# Patient Record
Sex: MALE | Race: WHITE
[De-identification: names, ages, dates, MRNs, and addresses within clinical notes are randomized per-mention and may not be internally consistent; named-entity substitution may affect disease eponyms.]

---

## 2018-08-19 ENCOUNTER — HOSPITAL ENCOUNTER (EMERGENCY)
Dept: HOSPITAL 42 - ED | Age: 33
Discharge: HOME | End: 2018-08-19
Payer: SELF-PAY

## 2018-08-19 ENCOUNTER — HOSPITAL ENCOUNTER (EMERGENCY)
Dept: HOSPITAL 31 - C.ER | Age: 33
Discharge: LEFT BEFORE DISCHARGE | End: 2018-08-19
Payer: SELF-PAY

## 2018-08-19 VITALS
OXYGEN SATURATION: 99 % | SYSTOLIC BLOOD PRESSURE: 124 MMHG | HEART RATE: 86 BPM | RESPIRATION RATE: 19 BRPM | DIASTOLIC BLOOD PRESSURE: 53 MMHG

## 2018-08-19 VITALS — TEMPERATURE: 100 F

## 2018-08-19 DIAGNOSIS — J02.9: Primary | ICD-10-CM

## 2018-08-19 DIAGNOSIS — Z02.89: Primary | ICD-10-CM

## 2018-08-19 DIAGNOSIS — R50.9: ICD-10-CM

## 2018-08-19 PROCEDURE — 99283 EMERGENCY DEPT VISIT LOW MDM: CPT

## 2018-08-19 PROCEDURE — 87070 CULTURE OTHR SPECIMN AEROBIC: CPT

## 2018-08-19 PROCEDURE — 87430 STREP A AG IA: CPT

## 2018-08-19 NOTE — ED PDOC
Arrival/HPI





- General


Chief Complaint: ENT Problem


Time Seen by Provider: 08/19/18 12:04


Historian: Patient





- History of Present Illness


Narrative History of Present Illness (Text): 





08/19/18 12:30





34 y/o male, with no significant PMH, who presents to the emergency department 

complaining of fever associated with sore throat since one day. Patient notes 

taking Tylenol with no significant relief. Patient denies cough, vomit, nausea, 

SOB, chest pain, abdominal pain sick contacts or other complaints. 


Time/Duration: 24 hours


Symptom Onset: Sudden


Symptom Course: Unchanged


Context: Home





Past Medical History





- Provider Review


Nursing Documentation Reviewed: Yes





- Infectious Disease


Hx of Infectious Diseases: None





- Cardiac


Hx Cardiac Disorders: No





- Pulmonary


Hx Respiratory Disorders: No





- Neurological


Hx Neurological Disorder: No





- Endocrine/Metabolic


Hx Endocrine Disorders: No





- Musculoskeletal/Rheumatological


Hx Musculoskeletal Disorders: No





- Psychiatric


Hx Substance Use: No





- Anesthesia


Hx Anesthesia: No





Family/Social History





- Physician Review


Nursing Documentation Reviewed: Yes


Family/Social History: Unknown Family HX


Smoking Status: Unknown If Ever Smoked


Hx Alcohol Use: No


Hx Substance Use: No





Allergies/Home Meds


Allergies/Adverse Reactions: 


Allergies





No Known Allergies Allergy (Verified 08/19/18 12:15)


 











Review of Systems





- Review of Systems


Constitutional: Fevers


ENT: Sore Throat.  absent: Sinus Congestion


Respiratory: absent: SOB


Cardiovascular: absent: Chest Pain


Gastrointestinal: absent: Abdominal Pain


Genitourinary Male: absent: Dysuria


Musculoskeletal: absent: Back Pain


Skin: absent: Rash


Neurological: absent: Headache


Endocrine: absent: Diaphoresis





Physical Exam


Vital Signs Reviewed: Yes


Vital Signs











  Temp Pulse Resp BP Pulse Ox


 


 08/19/18 14:13  100 F H  86  19  124/53 L  99


 


 08/19/18 14:12  100 F H    


 


 08/19/18 13:18  101.6 F H  96 H  18   98


 


 08/19/18 12:11  103 F H  115 H  20  113/66  100











Temperature: Febrile


Blood Pressure: Normal


Pulse: Tachycardic


Respiratory Rate: Normal


Appearance: Positive for: Well-Appearing, Non-Toxic, Comfortable


Pain Distress: None


Mental Status: Positive for: Alert and Oriented X 3





- Systems Exam


Head: Present: Atraumatic, Normocephalic


Pupils: Present: PERRL


Extroacular Muscles: Present: EOMI


Conjunctiva: Present: Normal


Pharnyx: Present: EXUDATE, Other (no hot potato voice).  No: TONSILS ENLARGED, 

Peritonsilar Swelling


Respiratory/Chest: Present: Clear to Auscultation, Good Air Exchange.  No: 

Respiratory Distress, Accessory Muscle Use, Wheezes, Rales, Retracting, Rhonchi


Cardiovascular: Present: Regular Rate and Rhythm, Normal S1, S2.  No: Murmurs


Neurological: Present: GCS=15, CN II-XII Intact, Speech Normal


Skin: Present: Warm, Dry, Normal Color.  No: Rashes


Lymphatic: Present: Cervical Adenopathy


Psychiatric: Present: Alert, Oriented x 3, Normal Insight, Normal Concentration





Medical Decision Making


ED Course and Treatment: 





08/19/18 


Impressions: 34 y/o male with exudate pharynx and cervical adenopathy 

complaining of sore throat and fever since 1 day.





Plans:


-- Decadron and Tylenol


-- Labs





Progress Notes: 


08/24/18 11:45


high clinical suspcion for strep. antibiotics given. pt symptoms improved. 

tachycardia fever resolved. asks for dc





- Lab Interpretations


Microbiology Results: 


Microbiology Results





08/19/18 13:25   Throat   Group A Strep Throat Culture - Final


                            NO BETA STREP GROUP A ISOLATED.








Lab Results: 


 Lab Results





08/19/18 13:25: Grp A Beta Strep Ag Negative








I have reviewed the lab results: Yes





- Medication Orders


Current Medication Orders: 











Discontinued Medications





Acetaminophen (Tylenol 325mg Tab)  975 mg PO STAT STA


   Stop: 08/19/18 12:20


   Last Admin: 08/19/18 12:26  Dose: 975 mg





MAR Pain/Vitals


 Document     08/19/18 12:26  LINDSEY  (Rec: 08/19/18 12:27  LINDSEY  [id number])


     Pain Reassessment


      Is This A Pain ReAssessment?               No


     Sleep


      Is patient sleeping during reassessment?   No


     Presence of Pain


      Presence of Pain                           Yes


     Pain Scale Used


      Pain Scale Used                            Numeric


Re-Assess: MAR Pain/Vitals


 Document     08/19/18 13:26  GMI  (Rec: 08/19/18 13:58  GMI  [id number])


     Pain Reassessment


      Is This A Pain ReAssessment?               Yes


     Sleep


      Is patient sleeping during reassessment?   No


     Presence of Pain


      Presence of Pain                           Yes


     Pain Scale Used


      Pain Scale Used                            Numeric


     Location


      Left, Right or Bilateral                   Right


      Pain Location Body Site                    Knee





Amoxicillin (Amoxil 500 Mg Cap)  500 mg PO STAT STA


   PRN Reason: Protocol


   Stop: 08/19/18 13:53


   Last Admin: 08/19/18 14:10  Dose: 500 mg





Dexamethasone (Decadron)  10 mg PO STAT STA


   Stop: 08/19/18 12:20


   Last Admin: 08/19/18 12:27  Dose: 10 mg











- Scribe Statement


The provider has reviewed the documentation as recorded by the [place]tr[place] Alexander





Provider Scribe Attestation:


All medical record entries made by the Scribe were at my direction and 

personally dictated by me. I have reviewed the chart and agree that the record 

accurately reflects my personal performance of the history, physical exam, 

medical decision making, and the department course for this patient. I have 

also personally directed, reviewed, and agree with the discharge instructions 

and disposition.





Disposition/Present on Arrival





- Present on Arrival


Any Indicators Present on Arrival: No


History of DVT/PE: No


History of Uncontrolled Diabetes: No


Urinary Catheter: No


History of Decub. Ulcer: No


History Surgical Site Infection Following: None





- Disposition


Have Diagnosis and Disposition been Completed?: Yes


Diagnosis: 


 Pharyngitis





Disposition: HOME/ ROUTINE


Disposition Time: 02:00


Condition: STABLE


Discharge Instructions (ExitCare):  Sore Throat in Adults


Additional Instructions: 


please follow up with your doctor. return to er with worsening symptoms or 

concerns. 


Prescriptions: 


Amoxicillin 500 mg PO TID #30 tablet


Referrals: 


 Service [Outside] - Follow up with primary


[facility] [Outside] - Follow up with primary


Forms:  [employer]. (English)

## 2018-08-26 ENCOUNTER — HOSPITAL ENCOUNTER (EMERGENCY)
Dept: HOSPITAL 42 - ED | Age: 33
Discharge: HOME | End: 2018-08-26
Payer: SELF-PAY

## 2018-08-26 VITALS
OXYGEN SATURATION: 100 % | HEART RATE: 84 BPM | TEMPERATURE: 98 F | SYSTOLIC BLOOD PRESSURE: 114 MMHG | DIASTOLIC BLOOD PRESSURE: 76 MMHG

## 2018-08-26 VITALS — RESPIRATION RATE: 18 BRPM

## 2018-08-26 DIAGNOSIS — S16.1XXA: ICD-10-CM

## 2018-08-26 DIAGNOSIS — S39.012A: ICD-10-CM

## 2018-08-26 DIAGNOSIS — S06.0X0A: ICD-10-CM

## 2018-08-26 DIAGNOSIS — S01.01XA: Primary | ICD-10-CM
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
## 2018-08-26 PROCEDURE — 70450 CT HEAD/BRAIN W/O DYE: CPT

## 2018-08-26 PROCEDURE — 72128 CT CHEST SPINE W/O DYE: CPT

## 2018-08-26 PROCEDURE — 96372 THER/PROPH/DIAG INJ SC/IM: CPT

## 2018-08-26 PROCEDURE — 72125 CT NECK SPINE W/O DYE: CPT

## 2018-08-26 PROCEDURE — 99285 EMERGENCY DEPT VISIT HI MDM: CPT

## 2018-08-26 PROCEDURE — 12001 RPR S/N/AX/GEN/TRNK 2.5CM/<: CPT

## 2018-08-26 PROCEDURE — 72131 CT LUMBAR SPINE W/O DYE: CPT
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
## 2018-08-26 NOTE — CT
Date of service: 



08/26/2018



PROCEDURE:  CT Cervical Spine without contrast



HISTORY:

INJURY



COMPARISON:

None available.



TECHNIQUE:

Axial computed tomography images were obtained of the cervical spine 

without the use of intravenous contrast. Coronal and sagittal 

reformatted images were created and reviewed.



Radiation dose: 



Total exam DLP = 579.98  mGy-cm.



This CT exam was performed using one or more of the following dose 

reduction techniques: Automated exposure control, adjustment of the 

mA and/or kV according to patient size, and/or use of iterative 

reconstruction technique.



FINDINGS:



VERTEBRAE:

There is normal alignment of the cervical vertebral bodies.  There is 

normal cervical lordosis.  Vertebral height is normal. Bone 

mineralization is normal. There is no acute fracture or traumatic 

anterior listhesis. The craniocervical junction is normal.  The 

atlantoaxial joint normal. 



The focal area of sclerosis in the C2 spinous process is 

statistically most compatible with a bone island. 



DISCS/SPINAL CANAL/NEURAL FORAMINA:

No significant central canal or neural foraminal stenosis. Discs 

heights are grossly preserved.



PARASPINAL SOFT TISSUES:

Unremarkable. 



OTHER FINDINGS:

No prevertebral soft tissue thickening. The lung apices are clear P



IMPRESSION:

No acute fracture or traumatic anterolisthesis. 



A preliminary report was provided by [employer] services.

## 2018-08-26 NOTE — CT
Date of service: 



08/26/2018



PROCEDURE:  CT Thoracic Spine without contrast



HISTORY:

INJURY







COMPARISON:

None available.



TECHNIQUE:

Axial computed tomography images were obtained of the thoracic spine 

without intravenous contrast. Coronal and sagittal reformatted images 

were created and reviewed.



Radiation dose:



Total exam DLP = 1016.07 mGy-cm.



This CT exam was performed using one or more of the following dose 

reduction techniques: Automated exposure control, adjustment of the 

mA and/or kV according to patient size, and/or use of iterative 

reconstruction technique.



FINDINGS:



VERTEBRAE:

There is normal alignment of the thoracic vertebral bodies.  There is 

normal thoracic kyphosis.  There is no acute fracture.  Bone 

mineralization is normal. 



DISCS/SPINAL CANAL/NEURAL FORAMINA:

Within the limits of the CT technique, no disc herniation seen. No 

central canal or neural foraminal stenosis..



PARASPINAL SOFT TISSUES:

Unremarkable.



OTHER FINDINGS:

Unremarkable.



IMPRESSION:

No acute fracture. 



A preliminary report was provided by [employer] services.

## 2018-08-26 NOTE — CT
Date of service: 



08/26/2018



PROCEDURE:  CT HEAD WITHOUT CONTRAST.



HISTORY:

Injury 



COMPARISON:

None available.



TECHNIQUE:

Axial computed tomography images were obtained through the head/brain 

without intravenous contrast.  



Radiation dose:



Total exam DLP = 1066.52 mGy-cm.



This CT exam was performed using one or more of the following dose 

reduction techniques: Automated exposure control, adjustment of the 

mA and/or kV according to patient size, and/or use of iterative 

reconstruction technique.



FINDINGS:



HEMORRHAGE:

No intracranial hemorrhage. 



BRAIN:

Gray-white matter differentiation is preserved.  There is no mass, 

mass effect or abnormal extra-axial fluid collection.  There is no 

territorial infarction. The midline sagittal structures are normal.



VENTRICLES:

The ventricles are normal in size, shape and configuration.



CALVARIUM:

There is no calvarial fracture. Mild posterior vertex scalp swelling. 



PARANASAL SINUSES:

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  The 

remaining included paranasal sinuses are clear. 



MASTOID AIR CELLS:

Unremarkable as visualized. No inflammatory changes.



OTHER FINDINGS:

None.



IMPRESSION:

No acute intracranial abnormality.



A preliminary report was provided by [employer] services.

## 2018-08-26 NOTE — CT
Date of service: 



08/26/2018



PROCEDURE:  CT Lumbar Spine without contrast



HISTORY:

INJURY



COMPARISON:

None available.



TECHNIQUE:

Axial computed tomography images were obtained of the lumbar spine 

without the use of intravenous contrast. Coronal and sagittal 

reformatted images were created and reviewed. 



Radiation dose:



Total exam DLP = 1016.07 mGy-cm.



This CT exam was performed using one or more of the following dose 

reduction techniques: Automated exposure control, adjustment of the 

mA and/or kV according to patient size, and/or use of iterative 

reconstruction technique.



FINDINGS:



VERTEBRAE:

There is normal alignment of the lumbar vertebral bodies.  There is 

normal lumbar lordosis. There is no acute fracture, spondylolysis or 

spondylolisthesis.  Bone mineralization is normal.



DISC SPACES:

The disc heights are maintained.



DISCS/SPINAL CANAL/NEURAL FORAMINA:

L1-2:     Unremarkable.



L2-3:     Unremarkable.



L3-4:     Unremarkable.



L4-5:     Unremarkable.



L5-S1:   Unremarkable.



PARASPINAL SOFT TISSUES:

Normal. 



OTHER FINDINGS:

None. 



IMPRESSION:

No acute fracture, spondylolysis or spondylolisthesis. 



A preliminary report was provided by [employer] services.